# Patient Record
Sex: OTHER/UNKNOWN | URBAN - METROPOLITAN AREA
[De-identification: names, ages, dates, MRNs, and addresses within clinical notes are randomized per-mention and may not be internally consistent; named-entity substitution may affect disease eponyms.]

---

## 2023-08-17 ENCOUNTER — HOSPITAL ENCOUNTER (EMERGENCY)
Age: 88
Discharge: HOME | DRG: 208 | End: 2023-08-19
Attending: EMERGENCY MEDICINE
Payer: COMMERCIAL

## 2023-08-17 ENCOUNTER — APPOINTMENT (OUTPATIENT)
Dept: GENERAL RADIOLOGY | Age: 88
DRG: 208 | End: 2023-08-17
Payer: COMMERCIAL

## 2023-08-17 ENCOUNTER — APPOINTMENT (OUTPATIENT)
Dept: CT IMAGING | Age: 88
DRG: 208 | End: 2023-08-17
Payer: COMMERCIAL

## 2023-08-17 VITALS
SYSTOLIC BLOOD PRESSURE: 72 MMHG | TEMPERATURE: 94.4 F | RESPIRATION RATE: 15 BRPM | RESPIRATION RATE: 15 BRPM | TEMPERATURE: 94.4 F | SYSTOLIC BLOOD PRESSURE: 72 MMHG | OXYGEN SATURATION: 100 % | DIASTOLIC BLOOD PRESSURE: 60 MMHG | HEART RATE: 84 BPM | HEART RATE: 84 BPM | WEIGHT: 90 LBS | OXYGEN SATURATION: 100 % | DIASTOLIC BLOOD PRESSURE: 60 MMHG | WEIGHT: 90 LBS

## 2023-08-17 DIAGNOSIS — I26.02 ACUTE SADDLE PULMONARY EMBOLISM WITH ACUTE COR PULMONALE (HCC): ICD-10-CM

## 2023-08-17 DIAGNOSIS — I46.9 CARDIAC ARREST (HCC): Primary | ICD-10-CM

## 2023-08-17 LAB
ALBUMIN SERPL-MCNC: 2.7 G/DL (ref 3.2–4.6)
ALBUMIN SERPL-MCNC: 2.7 G/DL (ref 3.2–4.6)
ALBUMIN/GLOB SERPL: 0.7 (ref 0.4–1.6)
ALBUMIN/GLOB SERPL: 0.7 (ref 0.4–1.6)
ALP SERPL-CCNC: 61 U/L (ref 50–136)
ALP SERPL-CCNC: 61 U/L (ref 50–136)
ALT SERPL-CCNC: 37 U/L (ref 12–65)
ALT SERPL-CCNC: 37 U/L (ref 12–65)
ANION GAP SERPL CALC-SCNC: 14 MMOL/L (ref 2–11)
ANION GAP SERPL CALC-SCNC: 14 MMOL/L (ref 2–11)
APPEARANCE UR: ABNORMAL
APPEARANCE UR: ABNORMAL
APTT PPP: 182 SEC (ref 24.5–34.2)
APTT PPP: 182 SEC (ref 24.5–34.2)
ARTERIAL PATENCY WRIST A: POSITIVE
AST SERPL-CCNC: 109 U/L (ref 15–37)
AST SERPL-CCNC: 109 U/L (ref 15–37)
BACTERIA URNS QL MICRO: ABNORMAL /HPF
BACTERIA URNS QL MICRO: ABNORMAL /HPF
BASE DEFICIT BLD-SCNC: 5.2 MMOL/L
BASE DEFICIT BLD-SCNC: 5.2 MMOL/L
BASE DEFICIT BLD-SCNC: 5.5 MMOL/L
BASE DEFICIT BLD-SCNC: 5.5 MMOL/L
BASOPHILS # BLD: 0 K/UL (ref 0–0.2)
BASOPHILS # BLD: 0 K/UL (ref 0–0.2)
BASOPHILS NFR BLD: 0 % (ref 0–2)
BASOPHILS NFR BLD: 0 % (ref 0–2)
BDY SITE: ABNORMAL
BILIRUB SERPL-MCNC: 0.5 MG/DL (ref 0.2–1.1)
BILIRUB SERPL-MCNC: 0.5 MG/DL (ref 0.2–1.1)
BILIRUB UR QL: ABNORMAL
BILIRUB UR QL: ABNORMAL
BUN SERPL-MCNC: 35 MG/DL (ref 8–23)
BUN SERPL-MCNC: 35 MG/DL (ref 8–23)
CA-I BLD-MCNC: 1.26 MMOL/L (ref 1.12–1.32)
CA-I BLD-MCNC: 1.26 MMOL/L (ref 1.12–1.32)
CALCIUM SERPL-MCNC: 9.1 MG/DL (ref 8.3–10.4)
CALCIUM SERPL-MCNC: 9.1 MG/DL (ref 8.3–10.4)
CHLORIDE SERPL-SCNC: 106 MMOL/L (ref 101–110)
CHLORIDE SERPL-SCNC: 106 MMOL/L (ref 101–110)
CO2 BLD-SCNC: 22 MMOL/L (ref 13–23)
CO2 BLD-SCNC: 22 MMOL/L (ref 13–23)
CO2 SERPL-SCNC: 18 MMOL/L (ref 21–32)
CO2 SERPL-SCNC: 18 MMOL/L (ref 21–32)
COLOR UR: ABNORMAL
COLOR UR: ABNORMAL
CREAT SERPL-MCNC: 1.9 MG/DL (ref 0.8–1.5)
CREAT SERPL-MCNC: 1.9 MG/DL (ref 0.8–1.5)
DIFFERENTIAL METHOD BLD: ABNORMAL
DIFFERENTIAL METHOD BLD: ABNORMAL
EKG ATRIAL RATE: 105 BPM
EKG ATRIAL RATE: 105 BPM
EKG DIAGNOSIS: NORMAL
EKG DIAGNOSIS: NORMAL
EKG P AXIS: 9 DEGREES
EKG P AXIS: 9 DEGREES
EKG P-R INTERVAL: 112 MS
EKG P-R INTERVAL: 112 MS
EKG Q-T INTERVAL: 372 MS
EKG Q-T INTERVAL: 372 MS
EKG QRS DURATION: 86 MS
EKG QRS DURATION: 86 MS
EKG QTC CALCULATION (BAZETT): 492 MS
EKG QTC CALCULATION (BAZETT): 492 MS
EKG R AXIS: -2 DEGREES
EKG R AXIS: -2 DEGREES
EKG T AXIS: 87 DEGREES
EKG T AXIS: 87 DEGREES
EKG VENTRICULAR RATE: 105 BPM
EKG VENTRICULAR RATE: 105 BPM
EOSINOPHIL # BLD: 0 K/UL (ref 0–0.8)
EOSINOPHIL # BLD: 0 K/UL (ref 0–0.8)
EOSINOPHIL NFR BLD: 0 % (ref 0.5–7.8)
EOSINOPHIL NFR BLD: 0 % (ref 0.5–7.8)
EPI CELLS #/AREA URNS HPF: ABNORMAL /HPF
EPI CELLS #/AREA URNS HPF: ABNORMAL /HPF
ERYTHROCYTE [DISTWIDTH] IN BLOOD BY AUTOMATED COUNT: 16.6 % (ref 11.9–14.6)
ERYTHROCYTE [DISTWIDTH] IN BLOOD BY AUTOMATED COUNT: 16.6 % (ref 11.9–14.6)
ERYTHROCYTE [DISTWIDTH] IN BLOOD BY AUTOMATED COUNT: 17.5 % (ref 11.9–14.6)
ERYTHROCYTE [DISTWIDTH] IN BLOOD BY AUTOMATED COUNT: 17.5 % (ref 11.9–14.6)
FIO2 ON VENT: 75 %
FIO2 ON VENT: 75 %
GAS FLOW.O2 O2 DELIVERY SYS: ABNORMAL
GLOBULIN SER CALC-MCNC: 3.7 G/DL (ref 2.8–4.5)
GLOBULIN SER CALC-MCNC: 3.7 G/DL (ref 2.8–4.5)
GLUCOSE BLD STRIP.AUTO-MCNC: 104 MG/DL (ref 65–100)
GLUCOSE BLD STRIP.AUTO-MCNC: 104 MG/DL (ref 65–100)
GLUCOSE BLD STRIP.AUTO-MCNC: 131 MG/DL (ref 65–100)
GLUCOSE BLD STRIP.AUTO-MCNC: 131 MG/DL (ref 65–100)
GLUCOSE SERPL-MCNC: 118 MG/DL (ref 65–100)
GLUCOSE SERPL-MCNC: 118 MG/DL (ref 65–100)
GLUCOSE UR STRIP.AUTO-MCNC: NEGATIVE MG/DL
GLUCOSE UR STRIP.AUTO-MCNC: NEGATIVE MG/DL
HCO3 BLD-SCNC: 19.1 MMOL/L (ref 22–26)
HCO3 BLD-SCNC: 19.1 MMOL/L (ref 22–26)
HCO3 BLD-SCNC: 21.6 MMOL/L (ref 22–26)
HCO3 BLD-SCNC: 21.6 MMOL/L (ref 22–26)
HCT VFR BLD AUTO: 31.3 % (ref 41.1–50.3)
HCT VFR BLD AUTO: 31.3 % (ref 41.1–50.3)
HCT VFR BLD AUTO: 36.6 % (ref 41.1–50.3)
HCT VFR BLD AUTO: 36.6 % (ref 41.1–50.3)
HGB BLD-MCNC: 10.1 G/DL (ref 13.6–17.2)
HGB BLD-MCNC: 10.1 G/DL (ref 13.6–17.2)
HGB BLD-MCNC: 8.8 G/DL (ref 13.6–17.2)
HGB BLD-MCNC: 8.8 G/DL (ref 13.6–17.2)
HGB UR QL STRIP: ABNORMAL
HGB UR QL STRIP: ABNORMAL
IMM GRANULOCYTES # BLD AUTO: 0 K/UL (ref 0–0.5)
IMM GRANULOCYTES # BLD AUTO: 0 K/UL (ref 0–0.5)
IMM GRANULOCYTES NFR BLD AUTO: 1 % (ref 0–5)
IMM GRANULOCYTES NFR BLD AUTO: 1 % (ref 0–5)
INR PPP: 1.2
INR PPP: 1.2
IPAP/PIP/HIGH PEEP: 40
IPAP/PIP/HIGH PEEP: 40
IPAP/PIP: 39
IPAP/PIP: 39
KETONES UR QL STRIP.AUTO: ABNORMAL MG/DL
KETONES UR QL STRIP.AUTO: ABNORMAL MG/DL
LACTATE SERPL-SCNC: 3.2 MMOL/L (ref 0.4–2)
LACTATE SERPL-SCNC: 3.2 MMOL/L (ref 0.4–2)
LEUKOCYTE ESTERASE UR QL STRIP.AUTO: ABNORMAL
LEUKOCYTE ESTERASE UR QL STRIP.AUTO: ABNORMAL
LYMPHOCYTES # BLD: 1.8 K/UL (ref 0.5–4.6)
LYMPHOCYTES # BLD: 1.8 K/UL (ref 0.5–4.6)
LYMPHOCYTES NFR BLD: 39 % (ref 13–44)
LYMPHOCYTES NFR BLD: 39 % (ref 13–44)
MAGNESIUM SERPL-MCNC: 2.3 MG/DL (ref 1.8–2.4)
MAGNESIUM SERPL-MCNC: 2.3 MG/DL (ref 1.8–2.4)
MCH RBC QN AUTO: 24 PG (ref 26.1–32.9)
MCH RBC QN AUTO: 24 PG (ref 26.1–32.9)
MCH RBC QN AUTO: 24.2 PG (ref 26.1–32.9)
MCH RBC QN AUTO: 24.2 PG (ref 26.1–32.9)
MCHC RBC AUTO-ENTMCNC: 27.6 G/DL (ref 31.4–35)
MCHC RBC AUTO-ENTMCNC: 27.6 G/DL (ref 31.4–35)
MCHC RBC AUTO-ENTMCNC: 28.1 G/DL (ref 31.4–35)
MCHC RBC AUTO-ENTMCNC: 28.1 G/DL (ref 31.4–35)
MCV RBC AUTO: 85.5 FL (ref 82–102)
MCV RBC AUTO: 85.5 FL (ref 82–102)
MCV RBC AUTO: 87.8 FL (ref 82–102)
MCV RBC AUTO: 87.8 FL (ref 82–102)
MONOCYTES # BLD: 0.4 K/UL (ref 0.1–1.3)
MONOCYTES # BLD: 0.4 K/UL (ref 0.1–1.3)
MONOCYTES NFR BLD: 8 % (ref 4–12)
MONOCYTES NFR BLD: 8 % (ref 4–12)
NEUTS SEG # BLD: 2.5 K/UL (ref 1.7–8.2)
NEUTS SEG # BLD: 2.5 K/UL (ref 1.7–8.2)
NEUTS SEG NFR BLD: 53 % (ref 43–78)
NEUTS SEG NFR BLD: 53 % (ref 43–78)
NITRITE UR QL STRIP.AUTO: NEGATIVE
NITRITE UR QL STRIP.AUTO: NEGATIVE
NRBC # BLD: 0 K/UL (ref 0–0.2)
NRBC # BLD: 0 K/UL (ref 0–0.2)
NRBC # BLD: 0.04 K/UL (ref 0–0.2)
NRBC # BLD: 0.04 K/UL (ref 0–0.2)
O2/TOTAL GAS SETTING VFR VENT: 35 %
O2/TOTAL GAS SETTING VFR VENT: 35 %
OTHER OBSERVATIONS: ABNORMAL
OTHER OBSERVATIONS: ABNORMAL
PCO2 BLD: 32.7 MMHG (ref 35–45)
PCO2 BLD: 32.7 MMHG (ref 35–45)
PCO2 BLD: 47.1 MMHG (ref 35–45)
PCO2 BLD: 47.1 MMHG (ref 35–45)
PEEP RESPIRATORY: 8
PEEP RESPIRATORY: 8
PEEP RESPIRATORY: 8 CMH2O
PEEP RESPIRATORY: 8 CMH2O
PH BLD: 7.27 (ref 7.35–7.45)
PH BLD: 7.27 (ref 7.35–7.45)
PH BLD: 7.37 (ref 7.35–7.45)
PH BLD: 7.37 (ref 7.35–7.45)
PH UR STRIP: 5 (ref 5–9)
PH UR STRIP: 5 (ref 5–9)
PLATELET # BLD AUTO: 191 K/UL (ref 150–450)
PLATELET # BLD AUTO: 191 K/UL (ref 150–450)
PLATELET # BLD AUTO: 234 K/UL (ref 150–450)
PLATELET # BLD AUTO: 234 K/UL (ref 150–450)
PMV BLD AUTO: 10.8 FL (ref 9.4–12.3)
PMV BLD AUTO: 10.8 FL (ref 9.4–12.3)
PMV BLD AUTO: 9.4 FL (ref 9.4–12.3)
PMV BLD AUTO: 9.4 FL (ref 9.4–12.3)
PO2 BLD: 154 MMHG (ref 75–100)
PO2 BLD: 154 MMHG (ref 75–100)
PO2 BLD: 408 MMHG (ref 75–100)
PO2 BLD: 408 MMHG (ref 75–100)
POTASSIUM BLD-SCNC: 4.1 MMOL/L (ref 3.5–5.1)
POTASSIUM BLD-SCNC: 4.1 MMOL/L (ref 3.5–5.1)
POTASSIUM SERPL-SCNC: 5 MMOL/L (ref 3.5–5.1)
POTASSIUM SERPL-SCNC: 5 MMOL/L (ref 3.5–5.1)
PROT SERPL-MCNC: 6.4 G/DL (ref 6.3–8.2)
PROT SERPL-MCNC: 6.4 G/DL (ref 6.3–8.2)
PROT UR STRIP-MCNC: >300 MG/DL
PROT UR STRIP-MCNC: >300 MG/DL
PROTHROMBIN TIME: 15.4 SEC (ref 12.6–14.3)
PROTHROMBIN TIME: 15.4 SEC (ref 12.6–14.3)
RBC # BLD AUTO: 3.66 M/UL (ref 4.23–5.6)
RBC # BLD AUTO: 3.66 M/UL (ref 4.23–5.6)
RBC # BLD AUTO: 4.17 M/UL (ref 4.23–5.6)
RBC # BLD AUTO: 4.17 M/UL (ref 4.23–5.6)
RBC #/AREA URNS HPF: >100 /HPF
RBC #/AREA URNS HPF: >100 /HPF
RESPIRATORY RATE, POC: 16 (ref 5–40)
RESPIRATORY RATE, POC: 16 (ref 5–40)
RESPIRATORY RATE, POC: 20 (ref 5–40)
RESPIRATORY RATE, POC: 20 (ref 5–40)
SAO2 % BLD: 100 %
SAO2 % BLD: 100 %
SAO2 % BLD: 99.4 % (ref 95–98)
SAO2 % BLD: 99.4 % (ref 95–98)
SERVICE CMNT-IMP: ABNORMAL
SODIUM BLD-SCNC: 137 MMOL/L (ref 136–145)
SODIUM BLD-SCNC: 137 MMOL/L (ref 136–145)
SODIUM SERPL-SCNC: 138 MMOL/L (ref 133–143)
SODIUM SERPL-SCNC: 138 MMOL/L (ref 133–143)
SP GR UR REFRACTOMETRY: 1.03 (ref 1–1.02)
SP GR UR REFRACTOMETRY: 1.03 (ref 1–1.02)
SPECIMEN SITE: ABNORMAL
SPECIMEN SITE: ABNORMAL
SPECIMEN TYPE: ABNORMAL
SPECIMEN TYPE: ABNORMAL
TROPONIN I SERPL HS-MCNC: 7.4 PG/ML (ref 0–14)
TROPONIN I SERPL HS-MCNC: 7.4 PG/ML (ref 0–14)
UFH PPP CHRO-ACNC: 0.72 IU/ML (ref 0.3–0.7)
UFH PPP CHRO-ACNC: 0.72 IU/ML (ref 0.3–0.7)
UROBILINOGEN UR QL STRIP.AUTO: 0.2 EU/DL (ref 0.2–1)
UROBILINOGEN UR QL STRIP.AUTO: 0.2 EU/DL (ref 0.2–1)
VENTILATION MODE VENT: ABNORMAL
VT SETTING VENT: 360 ML
VT SETTING VENT: 360 ML
VT SETTING VENT: ABNORMAL
VT SETTING VENT: ABNORMAL
WBC # BLD AUTO: 4.6 K/UL (ref 4.3–11.1)
WBC # BLD AUTO: 4.6 K/UL (ref 4.3–11.1)
WBC # BLD AUTO: 4.7 K/UL (ref 4.3–11.1)
WBC # BLD AUTO: 4.7 K/UL (ref 4.3–11.1)
WBC URNS QL MICRO: >100 /HPF
WBC URNS QL MICRO: >100 /HPF

## 2023-08-17 PROCEDURE — 82330 ASSAY OF CALCIUM: CPT

## 2023-08-17 PROCEDURE — 84484 ASSAY OF TROPONIN QUANT: CPT

## 2023-08-17 PROCEDURE — 85520 HEPARIN ASSAY: CPT

## 2023-08-17 PROCEDURE — 83735 ASSAY OF MAGNESIUM: CPT

## 2023-08-17 PROCEDURE — 96376 TX/PRO/DX INJ SAME DRUG ADON: CPT

## 2023-08-17 PROCEDURE — 74018 RADEX ABDOMEN 1 VIEW: CPT

## 2023-08-17 PROCEDURE — 85027 COMPLETE CBC AUTOMATED: CPT

## 2023-08-17 PROCEDURE — 96375 TX/PRO/DX INJ NEW DRUG ADDON: CPT

## 2023-08-17 PROCEDURE — 96368 THER/DIAG CONCURRENT INF: CPT

## 2023-08-17 PROCEDURE — 96366 THER/PROPH/DIAG IV INF ADDON: CPT

## 2023-08-17 PROCEDURE — 84295 ASSAY OF SERUM SODIUM: CPT

## 2023-08-17 PROCEDURE — 6360000004 HC RX CONTRAST MEDICATION: Performed by: EMERGENCY MEDICINE

## 2023-08-17 PROCEDURE — 84132 ASSAY OF SERUM POTASSIUM: CPT

## 2023-08-17 PROCEDURE — 2580000003 HC RX 258: Performed by: EMERGENCY MEDICINE

## 2023-08-17 PROCEDURE — 36600 WITHDRAWAL OF ARTERIAL BLOOD: CPT

## 2023-08-17 PROCEDURE — 71260 CT THORAX DX C+: CPT

## 2023-08-17 PROCEDURE — 82947 ASSAY GLUCOSE BLOOD QUANT: CPT

## 2023-08-17 PROCEDURE — 99285 EMERGENCY DEPT VISIT HI MDM: CPT

## 2023-08-17 PROCEDURE — 85025 COMPLETE CBC W/AUTO DIFF WBC: CPT

## 2023-08-17 PROCEDURE — 96365 THER/PROPH/DIAG IV INF INIT: CPT

## 2023-08-17 PROCEDURE — 82803 BLOOD GASES ANY COMBINATION: CPT

## 2023-08-17 PROCEDURE — 36415 COLL VENOUS BLD VENIPUNCTURE: CPT

## 2023-08-17 PROCEDURE — 87040 BLOOD CULTURE FOR BACTERIA: CPT

## 2023-08-17 PROCEDURE — 85610 PROTHROMBIN TIME: CPT

## 2023-08-17 PROCEDURE — 82962 GLUCOSE BLOOD TEST: CPT

## 2023-08-17 PROCEDURE — 96367 TX/PROPH/DG ADDL SEQ IV INF: CPT

## 2023-08-17 PROCEDURE — 93010 ELECTROCARDIOGRAM REPORT: CPT | Performed by: INTERNAL MEDICINE

## 2023-08-17 PROCEDURE — 6360000002 HC RX W HCPCS: Performed by: EMERGENCY MEDICINE

## 2023-08-17 PROCEDURE — 85730 THROMBOPLASTIN TIME PARTIAL: CPT

## 2023-08-17 PROCEDURE — 71045 X-RAY EXAM CHEST 1 VIEW: CPT

## 2023-08-17 PROCEDURE — 81001 URINALYSIS AUTO W/SCOPE: CPT

## 2023-08-17 PROCEDURE — 93005 ELECTROCARDIOGRAM TRACING: CPT | Performed by: EMERGENCY MEDICINE

## 2023-08-17 PROCEDURE — 31500 INSERT EMERGENCY AIRWAY: CPT

## 2023-08-17 PROCEDURE — 2500000003 HC RX 250 WO HCPCS: Performed by: EMERGENCY MEDICINE

## 2023-08-17 PROCEDURE — 83605 ASSAY OF LACTIC ACID: CPT

## 2023-08-17 PROCEDURE — 80053 COMPREHEN METABOLIC PANEL: CPT

## 2023-08-17 RX ORDER — ETOMIDATE 2 MG/ML
INJECTION INTRAVENOUS DAILY PRN
Status: COMPLETED | OUTPATIENT
Start: 2023-08-17 | End: 2023-08-17

## 2023-08-17 RX ORDER — SUCCINYLCHOLINE CHLORIDE 20 MG/ML
INJECTION INTRAMUSCULAR; INTRAVENOUS DAILY PRN
Status: COMPLETED | OUTPATIENT
Start: 2023-08-17 | End: 2023-08-17

## 2023-08-17 RX ORDER — FENTANYL CITRATE-0.9 % NACL/PF 10 MCG/ML
25-200 PLASTIC BAG, INJECTION (ML) INTRAVENOUS CONTINUOUS
Status: DISCONTINUED | OUTPATIENT
Start: 2023-08-17 | End: 2023-08-19 | Stop reason: HOSPADM

## 2023-08-17 RX ORDER — HEPARIN SODIUM 1000 [USP'U]/ML
40 INJECTION, SOLUTION INTRAVENOUS; SUBCUTANEOUS PRN
Status: DISCONTINUED | OUTPATIENT
Start: 2023-08-17 | End: 2023-08-19 | Stop reason: HOSPADM

## 2023-08-17 RX ORDER — PROPOFOL 10 MG/ML
5-50 INJECTION, EMULSION INTRAVENOUS CONTINUOUS
Status: DISCONTINUED | OUTPATIENT
Start: 2023-08-17 | End: 2023-08-19 | Stop reason: HOSPADM

## 2023-08-17 RX ORDER — MIDAZOLAM HYDROCHLORIDE 1 MG/ML
1-10 INJECTION, SOLUTION INTRAVENOUS CONTINUOUS
Status: DISCONTINUED | OUTPATIENT
Start: 2023-08-17 | End: 2023-08-19 | Stop reason: HOSPADM

## 2023-08-17 RX ORDER — 0.9 % SODIUM CHLORIDE 0.9 %
1000 INTRAVENOUS SOLUTION INTRAVENOUS
Status: COMPLETED | OUTPATIENT
Start: 2023-08-17 | End: 2023-08-17

## 2023-08-17 RX ORDER — FENTANYL CITRATE-0.9 % NACL/PF 10 MCG/ML
25-200 PLASTIC BAG, INJECTION (ML) INTRAVENOUS CONTINUOUS
Status: DISCONTINUED | OUTPATIENT
Start: 2023-08-17 | End: 2023-08-17

## 2023-08-17 RX ORDER — HEPARIN SODIUM 10000 [USP'U]/100ML
5-30 INJECTION, SOLUTION INTRAVENOUS CONTINUOUS
Status: DISCONTINUED | OUTPATIENT
Start: 2023-08-17 | End: 2023-08-19 | Stop reason: HOSPADM

## 2023-08-17 RX ORDER — MIDAZOLAM HYDROCHLORIDE 1 MG/ML
5 INJECTION INTRAMUSCULAR; INTRAVENOUS ONCE
Status: COMPLETED | OUTPATIENT
Start: 2023-08-17 | End: 2023-08-17

## 2023-08-17 RX ORDER — HEPARIN SODIUM 1000 [USP'U]/ML
80 INJECTION, SOLUTION INTRAVENOUS; SUBCUTANEOUS ONCE
Status: COMPLETED | OUTPATIENT
Start: 2023-08-17 | End: 2023-08-17

## 2023-08-17 RX ORDER — FENTANYL CITRATE-0.9 % NACL/PF 20 MCG/2ML
50 SYRINGE (ML) INTRAVENOUS EVERY 30 MIN PRN
Status: DISCONTINUED | OUTPATIENT
Start: 2023-08-17 | End: 2023-08-19 | Stop reason: HOSPADM

## 2023-08-17 RX ORDER — HEPARIN SODIUM 1000 [USP'U]/ML
80 INJECTION, SOLUTION INTRAVENOUS; SUBCUTANEOUS PRN
Status: DISCONTINUED | OUTPATIENT
Start: 2023-08-17 | End: 2023-08-19 | Stop reason: HOSPADM

## 2023-08-17 RX ADMIN — IOPAMIDOL 75 ML: 755 INJECTION, SOLUTION INTRAVENOUS at 10:54

## 2023-08-17 RX ADMIN — FENTANYL CITRATE 50 MCG/HR: 0.05 INJECTION, SOLUTION INTRAMUSCULAR; INTRAVENOUS at 09:25

## 2023-08-17 RX ADMIN — MIDAZOLAM 2.5 MG: 1 INJECTION INTRAMUSCULAR; INTRAVENOUS at 09:47

## 2023-08-17 RX ADMIN — HEPARIN SODIUM 18 UNITS/KG/HR: 10000 INJECTION, SOLUTION INTRAVENOUS at 11:46

## 2023-08-17 RX ADMIN — FENTANYL CITRATE 100 MCG/HR: 0.05 INJECTION, SOLUTION INTRAMUSCULAR; INTRAVENOUS at 11:53

## 2023-08-17 RX ADMIN — PROPOFOL 20 MCG/KG/MIN: 10 INJECTION, EMULSION INTRAVENOUS at 09:33

## 2023-08-17 RX ADMIN — SODIUM CHLORIDE 1000 ML: 9 INJECTION, SOLUTION INTRAVENOUS at 10:03

## 2023-08-17 RX ADMIN — Medication 50 MG: at 09:18

## 2023-08-17 RX ADMIN — HEPARIN SODIUM 3260 UNITS: 1000 INJECTION INTRAVENOUS; SUBCUTANEOUS at 11:44

## 2023-08-17 RX ADMIN — FENTANYL CITRATE 50 MCG: 50 INJECTION, SOLUTION INTRAMUSCULAR; INTRAVENOUS at 09:16

## 2023-08-17 RX ADMIN — MIDAZOLAM 1 MG/HR: 5 INJECTION INTRAMUSCULAR; INTRAVENOUS at 10:28

## 2023-08-17 RX ADMIN — ETOMIDATE 10 MG: 2 INJECTION, SOLUTION INTRAVENOUS at 09:17

## 2023-08-17 RX ADMIN — FENTANYL CITRATE 100 MCG: 50 INJECTION INTRAMUSCULAR; INTRAVENOUS at 09:34

## 2023-08-17 ASSESSMENT — LIFESTYLE VARIABLES: HOW OFTEN DO YOU HAVE A DRINK CONTAINING ALCOHOL: PATIENT DECLINED

## 2023-08-17 ASSESSMENT — PULMONARY FUNCTION TESTS: PIF_VALUE: 39

## 2023-08-17 NOTE — ED NOTES
Pulse check, R femoral pulse palpable, compressions ended.  Supportive bag mask ventilation in progress      Minor Monet RN  08/17/23 0219

## 2023-08-17 NOTE — ED NOTES
Family at bedside. Pt remains on cardiac monitoring and continuous pulse ox.       Santino Garcia RN  08/17/23 6249

## 2023-08-17 NOTE — ED PROVIDER NOTES
Emergency Department Provider Note       PCP: No primary care provider on file. Age: 80 y.o. Sex: adult     DISPOSITION Decision To Admit 08/17/2023 10:55:35 AM       ICD-10-CM    1. Cardiac arrest [facility])  I46.9           Medical Decision Making     Complexity of Problems Addressed:  1 or more acute illnesses that pose a threat to life or bodily function. Data Reviewed and Analyzed:   I independently ordered and reviewed each unique test.  I reviewed external records: provider visit note from PCP. I reviewed external records: provider visit note from outside specialist.   The patients assessment required an independent historian: Son. The reason they were needed is important historical information not provided by the patient. I independently ordered and interpreted the ED EKG in the absence of a Cardiologist.    See below    I interpreted the X-rays chest x-ray ET tube in place. NG tube below diaphragm. Agree with radiologist interpretation. I interpreted the CT Scan CT chest with large PE noted in the left mainstem stem, smaller PE noted in the right. Agree with radiologist interpretation. .  I interpreted the labs. Discussion of management or test interpretation. We initiated CPR. After approximately 7 minutes and 2 rounds of CPR patient regained cardiac activities. She appeared to have a nasal cannula on from home. Oxygen continue. Patient was still having agonal spontaneous breathing. Heart rate tachycardic. Decision was made to intubate patient to protect her airway. I was able to speak with her son briefly. She has breast cancer. Metastases. Currently being treated with oncology. Just got discharged in outpatient IR for paracentesis. A drain 5.7 L from her abdomen this morning. As he picked up to drive her home she started to have difficulty breathing and became unresponsive. He immediately pulled over to the ER front door and got help.     Given her history of cancer 105 BPM    Atrial Rate 105 BPM    P-R Interval 112 ms    QRS Duration 86 ms    Q-T Interval 372 ms    QTc Calculation (Bazett) 492 ms    P Axis 9 degrees    R Axis -2 degrees    T Axis 87 degrees    Diagnosis       Sinus tachycardia  Low voltage, precordial leads  Abnormal R-wave progression, early transition  Nonspecific ST abnormality  Borderline prolonged QT interval    Confirmed by Joe Gong MD (), DHARMESH ([id number]) on 8/17/2023 10:03:16 AM          CT CHEST PULMONARY EMBOLISM W CONTRAST   Final Result   1. EXAM IS POSITIVE FOR BILATERAL PULMONARY EMBOLI WITH THE LARGEST IN A LEFT   UPPER LOBE VESSEL. ED PHYSICIAN AWARE OF THIS. CT FINDINGS OF BORDERLINE RIGHT VENTRICULAR STRAIN. 2. ET TUBE IN PLACE, ENTERIC TUBE IN PLACE. 3. SMALL PERICARDIAL FLUID, BILATERAL SMALL PLEURAL EFFUSIONS AND THE VISUALIZED   UPPER ABDOMEN SMALL AMOUNTS OF ASCITES. CORRELATE FOR ANASARCA CLINICALLY IN   THIS OTHERWISE CACHECTIC APPEARING PATIENT. 4. BIBASILAR BRONCHOPNEUMONIA WITH PATCHY SUBSEGMENTAL MUCOUS PLUGGING FINDINGS         XR CHEST PORTABLE   Final Result   1. Small to moderate bibasilar pleural effusions suggesting fluid overload. 2. Slight high position of the feeding tube. Advancement of this by 4 cm should   ensure that the tip and side-port reside within the stomach. This report was made using voice transcription. Despite my best efforts to avoid   any, transcription errors may persist. If there is any question about the   accuracy of the report or need for clarification, then please call [phone], or text me through [email or web address] for clarification or correction. XR ABDOMEN (KUB) (SINGLE AP VIEW)   Final Result   1. Small to moderate bibasilar pleural effusions suggesting fluid overload. 2. Slight high position of the feeding tube. Advancement of this by 4 cm should   ensure that the tip and side-port reside within the stomach.          This report was made using voice transcription. Despite my best efforts to avoid   any, transcription errors may persist. If there is any question about the   accuracy of the report or need for clarification, then please call [phone], or text me through perfectserv for clarification or correction. Voice dictation software was used during the making of this note. This software is not perfect and grammatical and other typographical errors may be present. This note has not been completely proofread for errors.      Paula Mcguire MD  08/17/23 9312

## 2023-08-17 NOTE — PROGRESS NOTES
Patient was intubated with a number 7.5 ET Tube. Tube placement verified by auscultation, by CXR, and ETCO2 monitor. ET Tube is secured at the 22 cm sonia at the lip and on the right side. Patient was intubated by ER Attending on the 1 attempt. Breath sounds are coarse and diminished. Patient is Negative for subcutaneous air and chest excursion is symmetric. Trachea is midline. Patient is also Negative for cyanosis and is Negative for pitting edema. Patient placed on ventilator on documented settings. All alarms are set and audible. Resuscitation bag is  at the head of the bed.       Ventilator Settings  Mode FIO2 Rate Tidal Volume Pressure PEEP I:E Ratio   AC/PRVC  75 %   16   360     8  1:1.6      Peak airway pressure: 39    Minute ventilation:  9.4        08/17/23 0925   Breath Sounds   Breath Sounds Bilateral Diminished   Vent Information   Ventilator ID [id number]   Ventilator Safety Check Performed Pre-Use Yes   Ventilator Initiate Yes   Vent Mode AC/PRVC   Ventilator Settings   FiO2  75 %   Insp Time (sec) 0.85 sec   Resp Rate (Set) 1616 bmp   Target Vt 360   PEEP/CPAP (cmH2O) 8   Vent Patient Data (Readings)   Vt Mandatory Exp (mL) 345 mL   Vt (Measured) 345 mL   Peak Inspiratory Pressure (cmH2O) 39 cmH2O   Rate Measured 25 br/min   Minute Volume (L/min) 9.4 Liters   Peak Expiratory Flow (lpm) 67 L/min   Mean Airway Pressure (cmH2O) 21 cmH20   Plateau Pressure (cm H2O) 30 cm H2O   Driving Pressure 22   Inspiratory Time 0.85 sec   I:E Ratio 1:1.6   Flow Sensitivity 2 L/min   Insp Rise Time (%) 30 %   Backup Apnea On   Backup Rate 10 Breaths Per Minute   Backup Vt 360   Vent Alarm Settings   Low Pressure (cmH2O) 5011.5 cmH2O   High Pressure (cmH2O) 50 cmH2O   Low Minute Volume (lpm) 4 L/min   High Minute Volume (lpm) 20 L/min   Low Exhaled Vt (ml) 200 mL   High Exhaled Vt (ml) 1000 mL   Apnea (secs) 20 secs   Additional Respiratoray Assessments   Humidification Source HME   Ambu Bag With Mask At Bedside Yes Non-Surgical Airway 08/17/23 Endotracheal tube   Placement Date/Time: 08/17/23 0920   Present on Admission/Arrival: No  Placed By: (c) In ED  Placement Verified By: Colorimetric ETCO2 device; Auscultation;Direct visualization  Mask Ventilation: Ventilated by mask (1)  Insertion attempts: 1  Location:. ..    Secured At 22 cm   Measured From Lips   Secured Location Right   Secured By Commercial tube negron   Site Assessment Dry   Cuff Pressure 32 cm H2O   Weaning Parameters   Mitchell Agitation Sedation Scale (RASS) -5   Respiratory   Respiratory (WDL) X   Respiratory Interventions H.O.B. elevated

## 2023-08-17 NOTE — ED TRIAGE NOTES
Pt brought to ED via car, pt unresponsive in the car after picked from outpatient procedure. Pt is cancer patient and had \"fluid drainage\" procedure. No pulses present upon arrival, CPR started.

## 2023-08-17 NOTE — ED NOTES
RN attempting to transport patient, ED RT unavailable. RN called CCU to see if ICU RT available, CCU unable to provide RT to assist in transport. Will wait for RT to transport patient to CCU.      Michele Carroll RN  08/17/23 6271

## 2023-08-17 NOTE — ED NOTES
TRANSFER - OUT REPORT:    Verbal report given to Felipa Drake RN on Cw Unk Xxrome  being transferred to [id number] for routine progression of patient care       Report consisted of patient's Situation, Background, Assessment and   Recommendations(SBAR). Information from the following report(s) Nurse Handoff Report was reviewed with the receiving nurse. Kipling Fall Assessment:    Presents to emergency department  because of falls (Syncope, seizure, or loss of consciousness): Yes  Age > 79: Yes  Altered Mental Status, Intoxication with alcohol or substance confusion (Disorientation, impaired judgment, poor safety awaremess, or inability to follow instructions): Yes  Impaired Mobility: Ambulates or transfers with assistive devices or assistance; Unable to ambulate or transer.: Yes  Nursing Judgement: Yes          Lines:   Peripheral IV 08/17/23 Left Antecubital (Active)   Site Assessment Clean, dry & intact 08/17/23 1004   Line Status Blood return noted 08/17/23 1004       Peripheral IV 08/17/23 Posterior;Right Forearm (Active)   Site Assessment Clean, dry & intact 08/17/23 1031   Line Status Blood return noted 08/17/23 1031        Opportunity for questions and clarification was provided.       Patient transported with:  Registered Nurse          Nelly Bartlett RN  08/17/23 1670

## 2023-08-17 NOTE — ED NOTES
Fentanyl received from [facility], pharmacist      Kirby Martinez, CARLOTTA  08/17/23 0890

## 2023-08-17 NOTE — ED NOTES
RN verified drip with Geoffrey garcia RN and ICU Saul LAGUERRE,  Versed at 2  Fetanyl at 100mcg  Heparin at 18 units     Mercer Cabot, RN  08/17/23 3774

## 2023-08-20 LAB
BACTERIA SPEC CULT: NORMAL
BACTERIA SPEC CULT: NORMAL
SERVICE CMNT-IMP: NORMAL
SERVICE CMNT-IMP: NORMAL

## 2023-08-22 LAB
BACTERIA SPEC CULT: NORMAL
SERVICE CMNT-IMP: NORMAL